# Patient Record
Sex: MALE | Race: WHITE | URBAN - METROPOLITAN AREA
[De-identification: names, ages, dates, MRNs, and addresses within clinical notes are randomized per-mention and may not be internally consistent; named-entity substitution may affect disease eponyms.]

---

## 2018-01-16 NOTE — PROGRESS NOTES
"  SUBJECTIVE:                                                    Ronnie Jackson is a 27 year old male who presents to clinic today for the following health issues:    HPI    Patient in clinic today for hemorrhoid.     Hemorrhoids  Onset: ongoing for several years, much worse over the past week    Description:   Pain: YES  Itching: YES    Accompanying Signs & Symptoms:  Blood streaked toilet paper: YES  Blood in stool: YES  Changes in stool pattern: YES - mucous and stringy    History:   Any previous GI studies done:unsure  Family History of colon cancer: No    Precipitating factors:   Change in medications    Alleviating factors:  Increased fiber in diet    Therapies Tried and outcome: increased fiber in diet    He states he has had hemorrhoids for years but the bleeding has been worse over the past week and he will have dripping blood during every bowel movement, usually 1-2 times per day and will often have to push the hemorrhoids back in place. He denies any blood within his stool. He does describe pain in the rectal area for the past month that radiates into the back. He has tried creams in the past without benefit. He also has more frequent burning in the epigastric area after drinking a beer or an energy drink which seems worse since starting Concerta 2 months ago. He denies any nausea, vomiting, diarrhea, constipation, fevers or chills. He does drink a fiber supplement daily which keeps his stools soft but he states he stools have been slightly \"mucousy\" lately. He was seen in the ED in 2016 for abdominal pain, diarrhea, and rectal bleeding with a normal abdominal CT and labs. It was recommended he undergo and EGD and colonoscopy but he never followed through with this. He denies any family history of colon cancer.     Problem list and histories reviewed & adjusted, as indicated.  Additional history: none    ROS:  GENERAL: Denies fever, fatigue, weakness, weight gain, or weight loss.  CARDIOVASCULAR: Denies " "chest pain, shortness of breath, irregular heartbeats,  palpitations, or edema.  RESPIRATORY: Denies cough, hemoptysis, and shortness of breath.  GASTROINTESTINAL: +Rectal bleeding/pain, epigastric pain. Denies nausea, vomiting, change in appetite, diarrhea, or constipation.  NEUROLOGIC: Denies headache, fainting, dizziness, memory loss, numbness, tingling, or seizures.      OBJECTIVE:     /68 (BP Location: Right arm, Patient Position: Chair, Cuff Size: Adult Large)  Pulse 95  Temp 98.9  F (37.2  C) (Temporal)  Resp 16  Ht 5' 10\" (1.778 m)  Wt 145 lb (65.8 kg)  SpO2 100%  BMI 20.81 kg/m2  Body mass index is 20.81 kg/(m^2).  GENERAL: healthy, alert and no distress  RESP: lungs clear to auscultation - no rales, rhonchi or wheezes  CV: regular rate and rhythm, normal S1 S2, no S3 or S4, no murmur, click or rub, no peripheral edema and peripheral pulses strong  ABDOMEN: soft, nontender, no hepatosplenomegaly, no masses and bowel sounds normal  RECTAL (male): normal sphincter tone, no visualized hemorrhoids on external exam    ASSESSMENT/PLAN:       ICD-10-CM    1. Rectal bleeding K62.5 CBC with platelets and differential     GENERAL SURG ADULT REFERRAL   2. Internal hemorrhoids K64.8 GENERAL SURG ADULT REFERRAL   3. Gastroesophageal reflux disease, esophagitis presence not specified K21.9 omeprazole (PRILOSEC) 20 MG CR capsule   4. Mucous in stools R19.5 Comprehensive metabolic panel (BMP + Alb, Alk Phos, ALT, AST, Total. Bili, TP)     Enteric Bacteria and Virus Panel by TALIA Stool     Fecal Lactoferrin     CBC with platelets and differential         Persistent rectal bleeding for the past month consistent with internal hemorrhoids although none were visualized on inspection today. I recommend he continue with a daily fiber supplement and re-try Preparation H cream as needed. I also recommend Sitz baths. Since symptoms have been worsening with new pain over the past month, I will send him to general surgery " for further evaluation and to discuss treatment options for his hemorrhoids. He is wondering if a colonoscopy is warranted and I told him to discuss this further with the general surgeon. We were able to get him seen today. If colonoscopy is warranted, he is medically cleared for this procedure.     For the mucous in his stools, will order a stool panel for further evaluation along with labs to rule out infection. He denies any change in color of his stools and does not drink alcohol often, maybe once per month.    His epigastric pain is consistent with GERD so will start him on omeprazole daily with breakfast to take for 6-8 weeks. If not helping, he will let me know. If working well, may switch to an H2 blocker instead due to potential long-term side effects of PPIs. He was encouraged to cut back on the foods that exacerbate his symptoms like spicy foods, tomato based foods, and energy drinks.       Dave Shipman PA-C  [facility]

## 2018-01-17 ENCOUNTER — OFFICE VISIT (OUTPATIENT)
Dept: SURGERY | Facility: OTHER | Age: 28
End: 2018-01-17
Payer: COMMERCIAL

## 2018-01-17 ENCOUNTER — TELEPHONE (OUTPATIENT)
Dept: SURGERY | Facility: OTHER | Age: 28
End: 2018-01-17

## 2018-01-17 ENCOUNTER — OFFICE VISIT (OUTPATIENT)
Dept: FAMILY MEDICINE | Facility: OTHER | Age: 28
End: 2018-01-17
Payer: COMMERCIAL

## 2018-01-17 VITALS
DIASTOLIC BLOOD PRESSURE: 68 MMHG | SYSTOLIC BLOOD PRESSURE: 136 MMHG | BODY MASS INDEX: 20.76 KG/M2 | HEIGHT: 70 IN | RESPIRATION RATE: 16 BRPM | TEMPERATURE: 98.9 F | OXYGEN SATURATION: 100 % | HEART RATE: 95 BPM | WEIGHT: 145 LBS

## 2018-01-17 VITALS
OXYGEN SATURATION: 100 % | HEART RATE: 95 BPM | BODY MASS INDEX: 20.81 KG/M2 | TEMPERATURE: 98.9 F | SYSTOLIC BLOOD PRESSURE: 136 MMHG | DIASTOLIC BLOOD PRESSURE: 68 MMHG | RESPIRATION RATE: 16 BRPM | WEIGHT: 145 LBS

## 2018-01-17 DIAGNOSIS — K62.89 ANAL OR RECTAL PAIN: ICD-10-CM

## 2018-01-17 DIAGNOSIS — K21.9 GASTROESOPHAGEAL REFLUX DISEASE, ESOPHAGITIS PRESENCE NOT SPECIFIED: ICD-10-CM

## 2018-01-17 DIAGNOSIS — K64.8 INTERNAL HEMORRHOIDS: Primary | ICD-10-CM

## 2018-01-17 DIAGNOSIS — F41.9 ANXIETY: ICD-10-CM

## 2018-01-17 DIAGNOSIS — R19.5 MUCOUS IN STOOLS: ICD-10-CM

## 2018-01-17 DIAGNOSIS — K62.5 BRBPR (BRIGHT RED BLOOD PER RECTUM): ICD-10-CM

## 2018-01-17 DIAGNOSIS — K64.8 INTERNAL HEMORRHOIDS: ICD-10-CM

## 2018-01-17 DIAGNOSIS — K62.5 RECTAL BLEEDING: Primary | ICD-10-CM

## 2018-01-17 LAB
ALBUMIN SERPL-MCNC: 4.6 G/DL (ref 3.4–5)
ALP SERPL-CCNC: 77 U/L (ref 40–150)
ALT SERPL W P-5'-P-CCNC: 23 U/L (ref 0–70)
ANION GAP SERPL CALCULATED.3IONS-SCNC: 7 MMOL/L (ref 3–14)
AST SERPL W P-5'-P-CCNC: 16 U/L (ref 0–45)
BASOPHILS # BLD AUTO: 0 10E9/L (ref 0–0.2)
BASOPHILS NFR BLD AUTO: 0.5 %
BILIRUB SERPL-MCNC: 0.8 MG/DL (ref 0.2–1.3)
BUN SERPL-MCNC: 10 MG/DL (ref 7–30)
CALCIUM SERPL-MCNC: 8.9 MG/DL (ref 8.5–10.1)
CHLORIDE SERPL-SCNC: 102 MMOL/L (ref 94–109)
CO2 SERPL-SCNC: 29 MMOL/L (ref 20–32)
CREAT SERPL-MCNC: 0.79 MG/DL (ref 0.66–1.25)
DIFFERENTIAL METHOD BLD: NORMAL
EOSINOPHIL # BLD AUTO: 0 10E9/L (ref 0–0.7)
EOSINOPHIL NFR BLD AUTO: 0.3 %
ERYTHROCYTE [DISTWIDTH] IN BLOOD BY AUTOMATED COUNT: 13 % (ref 10–15)
GFR SERPL CREATININE-BSD FRML MDRD: >90 ML/MIN/1.7M2
GLUCOSE SERPL-MCNC: 111 MG/DL (ref 70–99)
HCT VFR BLD AUTO: 43.7 % (ref 40–53)
HGB BLD-MCNC: 14.9 G/DL (ref 13.3–17.7)
LYMPHOCYTES # BLD AUTO: 1.7 10E9/L (ref 0.8–5.3)
LYMPHOCYTES NFR BLD AUTO: 27.9 %
MCH RBC QN AUTO: 29.1 PG (ref 26.5–33)
MCHC RBC AUTO-ENTMCNC: 34.1 G/DL (ref 31.5–36.5)
MCV RBC AUTO: 85 FL (ref 78–100)
MONOCYTES # BLD AUTO: 0.6 10E9/L (ref 0–1.3)
MONOCYTES NFR BLD AUTO: 10 %
NEUTROPHILS # BLD AUTO: 3.7 10E9/L (ref 1.6–8.3)
NEUTROPHILS NFR BLD AUTO: 61.3 %
PLATELET # BLD AUTO: 239 10E9/L (ref 150–450)
POTASSIUM SERPL-SCNC: 4.5 MMOL/L (ref 3.4–5.3)
PROT SERPL-MCNC: 7.2 G/DL (ref 6.8–8.8)
RBC # BLD AUTO: 5.12 10E12/L (ref 4.4–5.9)
SODIUM SERPL-SCNC: 138 MMOL/L (ref 133–144)
WBC # BLD AUTO: 6.1 10E9/L (ref 4–11)

## 2018-01-17 PROCEDURE — 85025 COMPLETE CBC W/AUTO DIFF WBC: CPT | Performed by: PHYSICIAN ASSISTANT

## 2018-01-17 PROCEDURE — 46600 DIAGNOSTIC ANOSCOPY SPX: CPT | Performed by: SURGERY

## 2018-01-17 PROCEDURE — 80053 COMPREHEN METABOLIC PANEL: CPT | Performed by: PHYSICIAN ASSISTANT

## 2018-01-17 PROCEDURE — 36415 COLL VENOUS BLD VENIPUNCTURE: CPT | Performed by: PHYSICIAN ASSISTANT

## 2018-01-17 PROCEDURE — 99214 OFFICE O/P EST MOD 30 MIN: CPT | Performed by: PHYSICIAN ASSISTANT

## 2018-01-17 PROCEDURE — 99244 OFF/OP CNSLTJ NEW/EST MOD 40: CPT | Mod: 25 | Performed by: SURGERY

## 2018-01-17 RX ORDER — METHYLPHENIDATE HYDROCHLORIDE 36 MG/1
TABLET ORAL
COMMUNITY

## 2018-01-17 RX ORDER — GUANFACINE 2 MG/1
2 TABLET ORAL AT BEDTIME
COMMUNITY

## 2018-01-17 RX ORDER — ESCITALOPRAM OXALATE 10 MG/1
50 TABLET ORAL DAILY
COMMUNITY

## 2018-01-17 RX ORDER — GABAPENTIN 300 MG/1
300 CAPSULE ORAL 3 TIMES DAILY
COMMUNITY

## 2018-01-17 ASSESSMENT — ANXIETY QUESTIONNAIRES
6. BECOMING EASILY ANNOYED OR IRRITABLE: NEARLY EVERY DAY
GAD7 TOTAL SCORE: 21
1. FEELING NERVOUS, ANXIOUS, OR ON EDGE: NEARLY EVERY DAY
GAD7 TOTAL SCORE: 21
3. WORRYING TOO MUCH ABOUT DIFFERENT THINGS: NEARLY EVERY DAY
7. FEELING AFRAID AS IF SOMETHING AWFUL MIGHT HAPPEN: NEARLY EVERY DAY
GAD7 TOTAL SCORE: 21
4. TROUBLE RELAXING: NEARLY EVERY DAY
5. BEING SO RESTLESS THAT IT IS HARD TO SIT STILL: NEARLY EVERY DAY
7. FEELING AFRAID AS IF SOMETHING AWFUL MIGHT HAPPEN: NEARLY EVERY DAY
2. NOT BEING ABLE TO STOP OR CONTROL WORRYING: NEARLY EVERY DAY

## 2018-01-17 ASSESSMENT — PAIN SCALES - GENERAL: PAINLEVEL: NO PAIN (0)

## 2018-01-17 NOTE — PATIENT INSTRUCTIONS
Your continued rectal bleeding is consistent with internal hemorrhoids.  Continue with a daily fiber supplement along with trying preparation H cream as needed to help with bleeding and discomfort.  Sitting in a warm water bath can also be helpful.  I will set you up with a surgeon for further evaluation and to discuss other treatment options for the hemorrhoids.  Will order labs today and you can bring in your stool testing at your convenience.     For the heartburn, I recommend omeprazole daily with breakfast for 4-6 weeks.  If symptoms are not improving, let me know.  Try to avoid spicy and tomato based foods along with alcohol.

## 2018-01-17 NOTE — MR AVS SNAPSHOT
After Visit Summary   1/17/2018    Ronnie Jackson    MRN: [id number]           Patient Information     Date Of Birth          1990        Visit Information        Provider Department      1/17/2018 8:30 AM Dave Shipman PA-C [facility]        Today's Diagnoses     Rectal bleeding    -  1    Internal hemorrhoids        Gastroesophageal reflux disease, esophagitis presence not specified        Mucous in stools          Care Instructions    Your continued rectal bleeding is consistent with internal hemorrhoids.  Continue with a daily fiber supplement along with trying preparation H cream as needed to help with bleeding and discomfort.  Sitting in a warm water bath can also be helpful.  I will set you up with a surgeon for further evaluation and to discuss other treatment options for the hemorrhoids.  Will order labs today and you can bring in your stool testing at your convenience.     For the heartburn, I recommend omeprazole daily with breakfast for 4-6 weeks.  If symptoms are not improving, let me know.  Try to avoid spicy and tomato based foods along with alcohol.                    Follow-ups after your visit        Additional Services     GENERAL SURG ADULT REFERRAL       Your provider has referred you to: [facility]: [facility] [phone]   [email or web address]     Please be aware that coverage of these services is subject to the terms and limitations of your health insurance plan.  Call member services at your health plan with any benefit or coverage questions.      Please bring the following with you to your appointment:    (1) Any X-Rays, CTs or MRIs which have been performed.  Contact the facility where they were done to arrange for  prior to your scheduled appointment.   (2) List of current medications   (3) This referral request   (4) Any documents/labs given to you for this referral                  Follow-up  "notes from your care team     Return if symptoms worsen or fail to improve.      Future tests that were ordered for you today     Open Future Orders        Priority Expected Expires Ordered    Enteric Bacteria and Virus Panel by TALIA Stool Routine  2019    Fecal Lactoferrin Routine  2019            Who to contact     If you have questions or need follow up information about today's clinic visit or your schedule please contact [facility] directly at [phone].  Normal or non-critical lab and imaging results will be communicated to you by MyChart, letter or phone within 4 business days after the clinic has received the results. If you do not hear from us within 7 days, please contact the clinic through [facility] or phone. If you have a critical or abnormal lab result, we will notify you by phone as soon as possible.  Submit refill requests through [employer] or call your pharmacy and they will forward the refill request to us. Please allow 3 business days for your refill to be completed.          Additional Information About Your Visit        [facility] Information     [employer] lets you send messages to your doctor, view your test results, renew your prescriptions, schedule appointments and more. To sign up, go to [email or web address] . Click on \"Log in\" on the left side of the screen, which will take you to the Welcome page. Then click on \"Sign up Now\" on the right side of the page.     You will be asked to enter the access code listed below, as well as some personal information. Please follow the directions to create your username and password.     Your access code is: [email or web address]  Expires: 2018  9:16 AM     Your access code will  in 90 days. If you need help or a new code, please call your [facility] or [phone].        Care EveryWhere ID     This is your Care EveryWhere ID. This could be used by other organizations to access your [facility] medical " "records  [id number]        Your Vitals Were     Pulse Temperature Respirations Height Pulse Oximetry BMI (Body Mass Index)    95 98.9  F (37.2  C) (Temporal) 16 5' 10\" (1.778 m) 100% 20.81 kg/m2       Blood Pressure from Last 3 Encounters:   01/17/18 136/68   03/09/16 120/80   03/05/16 142/85    Weight from Last 3 Encounters:   01/17/18 145 lb (65.8 kg)   03/09/16 149 lb 8 oz (67.8 kg)   03/05/16 150 lb (68 kg)              We Performed the Following     CBC with platelets and differential     Comprehensive metabolic panel (BMP + Alb, Alk Phos, ALT, AST, Total. Bili, TP)     GENERAL SURG ADULT REFERRAL          Today's Medication Changes          These changes are accurate as of: 1/17/18  9:18 AM.  If you have any questions, ask your nurse or doctor.               Start taking these medicines.        Dose/Directions    omeprazole 20 MG CR capsule   Commonly known as:  priLOSEC   Used for:  Gastroesophageal reflux disease, esophagitis presence not specified   Started by:  Dave Shipman PA-C        Dose:  20 mg   Take 1 capsule (20 mg) by mouth daily   Quantity:  30 capsule   Refills:  1         Stop taking these medicines if you haven't already. Please contact your care team if you have questions.     AMBIEN PO   Stopped by:  Dave Shipman PA-C           CARAFATE 1 GM/10ML suspension   Generic drug:  sucralfate   Stopped by:  Dave Shipman PA-C           SERTRALINE HCL PO   Stopped by:  Dave Shipman PA-C           VISTARIL PO   Stopped by:  Dave Shipman PA-C                Where to get your medicines      These medications were sent to [id number] - [place], [place] - [address] AT [address], [place] [address]    Hours:  Test fax successful 9/6/02   Phone:  [phone]     omeprazole 20 MG CR capsule                Primary Care Provider Fax #    Physician No Ref-Primary [phone]       No address on " file        Equal Access to Services     [facility] : Hadii gloria chamorro winston Watts, wasummerda luqadaha, qatammieta svetlanaseanlauren redding, [employer]. So [facility] [phone].    ATENCIÓN: Si habla español, tiene a allen disposición servicios gratuitos de asistencia lingüística. Llame al [phone].    We comply with applicable federal civil rights laws and [place] laws. We do not discriminate on the basis of race, color, national origin, age, disability, sex, sexual orientation, or gender identity.            Thank you!     Thank you for choosing [facility]  for your care. Our goal is always to provide you with excellent care. Hearing back from our patients is one way we can continue to improve our services. Please take a few minutes to complete the written survey that you may receive in the mail after your visit with us. Thank you!             Your Updated Medication List - Protect others around you: Learn how to safely use, store and throw away your medicines at www.disposemymeds.org.          This list is accurate as of: 1/17/18  9:18 AM.  Always use your most recent med list.                   Brand Name Dispense Instructions for use Diagnosis    CLONAZEPAM PO      Take 0.5 mg by mouth 3 times daily as needed for anxiety        CONCERTA 36 MG CR tablet   Generic drug:  methylphenidate ER           gabapentin 300 MG capsule    NEURONTIN     Take 300 mg by mouth 3 times daily        guanFACINE 2 MG tablet    TENEX     Take 2 mg by mouth At Bedtime        LEXAPRO 10 MG tablet   Generic drug:  escitalopram      Take 50 mg by mouth daily        omeprazole 20 MG CR capsule    priLOSEC    30 capsule    Take 1 capsule (20 mg) by mouth daily    Gastroesophageal reflux disease, esophagitis presence not specified       TEMAZEPAM PO

## 2018-01-17 NOTE — TELEPHONE ENCOUNTER
Surgery Scheduled    Date of Surgery 1/29/2018   Procedure: Diagnostic, colonoscopy, exam under anesthesia, possible of internal hemorrhoids  Hospital/Surgical Facility: [facility]  Surgeon: Dr. Benitez  Type of Anesthesia : MAC  Pre-op NA with   2 week post op:3 weeks after, patient will schedule        Surgery packet given to patient in clinic. Patients instructed to arrive 1 hours prior to surgery. Patient understood and agrees to plan.    Miranda Briceno  Surgical Scheduler

## 2018-01-17 NOTE — PATIENT INSTRUCTIONS
Anal Fissure  An anal fissure is a small tear in the lining of the anal canal. This type of tear may develop in adults from passing hard or large stools during bowel movements.   An anal fissure may cause you to experience pain and bleeding. More than 90 percent heal without surgery, and you can use topical creams or suppositories to provide relief as they heal. Anal fissures that fail to heal may become chronic and cause considerable discomfort.   If you develop an anal fissure that doesn't heal with medication, surgery may relieve your discomfort.   Anal fissures are one of the most common causes of anal pain. They occur at any age and often in people who are frequently or severely constipated. The majority of anal fissures heal with self-care steps, but some may require medication or surgery to relax the anal sphincter muscle. We always try conservative therapies such as medications before discussing surgical option.  Should surgery be necessary, patients seeking treatment at [facility] benefit from the experience of highly skilled surgeons and associate staff who can use less invasive techniques with smaller incisions and faster healing.  It is VERY important that you have a colonoscopy at age 50 or earlier if there is a familial history of colon/Rectal cancer. This test can detect colon or rectal cancer along with many other diseases that may not give you any symptoms. You may not even know that you have colon disease or cancer. Colon cancer is very treatable if detected early. Many lives are saved each year by having this examination.    When to see a doctor   See your doctor if you have pain during bowel movements or blood on stools or toilet paper after a bowel movement.  Causes of anal fissure include:     Large or hard stool passing through the anal canal    Constipation and straining during bowel movements    Inflammation of the anorectal area, such as is caused by  inflammatory bowel disease (IBD)     Anal sex    Trauma to the rectum    The main signs and symptoms of an anal fissure include:     Pain or burning during bowel movements that eases until the next bowel movement    Bright red blood on the outside of the stool or on toilet paper or wipes after a bowel movement    Itching or irritation around the anus.    A visible crack in the skin around the anus.  Treatment of acute anal fissures aims to break the cycle of passing hard stools (or explosive diarrhea) and the resulting pain and spasms. Treatment with medications that are applied to the rectum is aimed at stopping or decreasing the rectal muscle and valve (sphincter) from spasm. When the rectal muscles spasm or tense up from pain, irritation, straining they decrease blood flow to the injured area (fissure) and this delays healing. By relaxing the muscle with creams and sitz baths there is greater blood supply and healing of the fissure.   Healing time- fissures heal over a long period of time. This can take weeks to months and patience is needed as well as daily treatment. There is  no quick fix  to this medical problem.    Doctors generally recommend self-care procedures such as:    Soaking in warm water for 10 minutes to relax the anal sphincter muscle (Rectum)    Consumption of additional liquids, especially water and fiber    Stool bulking agents such as a bulk forming fiber supplement, with psyllium (generic brands are just fine to use).    Try to get 20-35 grams of fiber daily.    Acetaminophen (Tylenol) for pain.     Nonsteroidal anti-inflammatory drugs (NSAIDS) such as Ibuprofen, Motrin, Aleve can help to decrease inflammation and swelling in the area affected.    Use  Baby wipes  that do not have perfume, deodorants or other chemicals is less abrasive than toilet paper, and will clean the area better after bowel movements.       Fiber Supplementation- Very important! You should use a fiber supplement to ease  "the amount of straining when having a bowel movement. The fiber will lubricate the stool and will form more bulk allowing for easier passage. It will also decre *ase the amount of time spent sitting on the toilet which can prolong healing of the fissure.     Instructions- Take 1 to 1.5 table spoons of a  bulk forming laxative  Powder in the morning  with 8 ounces of water or juice. It is ok to buy generic brands, they are cheaper. The product should contain Psyllium. You should drink plenty of water during the day (64 ounces is preferred) unless you are on fluid restrictions from your health care provider. The fiber powder comes in different flavors, orange etc. Other fiber supplements such as wafers, fiber bars are also acceptable.     [employer] has their generic Equate Brand, called \"Fiber Therapy\" it's cheap, has psyllium and one large container will last about 3 months.     Medications    1. Diltiazem 2% cream (Cardizem)- is a blood pressure medication used to decrease the muscle spasms around the anus (rectum). This helps to promote blood flood to the fissure and thus healing. This should be applied to the rectum once in the morning and once in the evening.    2.  Lidocaine 2% gel-This medication is numbing medicine and can be applied to the end of the suppository prior to inserting it into the rectum. It can be used intermittently during the day and should be applied to the anus using your finger to help with  pain and flare up.  It should not be used continuously during the day.    3. Annusol HC 25mg Suppositories- These suppositories are inserted into the rectum using your finger. The suppository will decrease swelling and inflammation around the rectum. It contains a small amount of steroid and this will help with healing of the fissure. You will typically only use the Anusol HC suppositories for 2 weeks.    4. Sitz baths- sitting in the bathtub or using a shower wand with warm water for 10 to 15 minutes, 2 " to 3 times per day will help keep the rectum clean and will help the healing process by promoting blood flow.      5. Baby wipes-using baby wipes that are flushable, deodorant free and detergent free is less abrasive then using toilet paper when wiping after having a bowel movement.

## 2018-01-17 NOTE — LETTER
1/17/2018         RE: Ronnie Jackson  [address] [facility] [address]        Dear Colleague,    Thank you for referring your patient, Ronnie Jackson, to the [facility]. Please see a copy of my visit note below.    General Surgery Consultation    Ronnie Jackson MRN# [id number]   Age: 27 year old YOB: 1990     Reason for consult: Hemorrhoids, internal  Hemorrhoids, external                        Assessment and Plan:   I was asked to see this patient at the request of Rivera Shipmanfor evaluation of .  Ronnie Jackson is a 27 year old male who presented with history, exam, laboratory and imaging most consistent with:       ICD-10-CM    1. Internal hemorrhoids K64.8 DIAGNOSTIC ANOSCOPY [29692]   2. BRBPR (bright red blood per rectum) K62.5 DIAGNOSTIC ANOSCOPY [31206]   3. Anal or rectal pain K62.89 DIAGNOSTIC ANOSCOPY [33905]   4. Anxiety F41.9      Will schedule colonoscopy, examine under anesthesia, possible banding of bleeding internal hemorrhoids.  Told pt that I will not band the internal hemorrhoids unless it is bleeding  Pt symptoms of anal/rectal pain at the 6-7 oclock sounds like a fissure - however, I could not see any small mucosa tear at the anal opening; and I could not see clearly on the anoscopy exam in the office.  It could be very small and higher up.    We talk about conservative management for internal hemorrhoids and fissure:    -ingest 20 to 30 g of insoluble fiber per day and drink plenty of water (1.5 to 2 liters per day). Both are necessary to produce regular, soft stools, which reduce straining at defecation. It could take six weeks to fully realize the beneficial effect of fiber   -refrain from straining or lingering (eg, reading) on the toilet.  - have regular physical exercise  - limit their intake of fatty foods and alcohol, which can exacerbate constipation   - Sitz baths can relieve irritation and pruritus as well as spasm of the anal sphincter muscles. Used  with warm, rather than cold, water two to three times per day  Will try the above now and after colonoscopy  I will add Cardizem ointment if I see an actual fissure in the exam under anesthesia   See me back in the office in 3-4 weeks to see if symptoms improve    45 mins visit, more than 50% of face to face time was spent in counseling and coordinating care as discussed above.      I thank WALTER Garber for the opportunity to participate in the patient's care.           Chief Complaint:   Internal hemorrhoids, BRBPR, anal pain     History is obtained from the patient         History of Present Illness:   This patient is a 27 year old  male with a significant past medical history of anxiety who presents with 1 week history of BRBPR.  Patient stated that he has been struggling with hemorrhoids for several years.  He has had intermittent flareup of internal hemorrhoids.  He has had bright red blood per rectum in the past however the bleeding would go away after 1-2 days.  This particular episode, the bleeding conts to persist.  He noted dripping blood into the toilet after his morning BM the past week.  Hes been on OTC fiber for several years, he takes fiber every morning and it does help soften his stool.   Pt also complains of rectal/anal pain in posterior midline and radiates into his back.    Pt endorses some mucosy stools the past few BMs.  Pt stated that he just started Concerta for his anxiety and ADHD, thinks that hes become more constipated and has been straining more when he has his BMs.  Pt denies any recent trauma, changes in sexual practices, and no one particular episode of very hard stool that would incite the bleeding/pain.  Pt denies history of having fissures.  Denies hx of crohn's or UC.  Denies famhx of IBD also.   He ever had a colonoscopy; denies famhx of colon cancer; denies personal history of colon cancer or any cancers.  Pt has not increased the amt of fiber he's taking; stated he  "eats more \"fibery\" foods and dialy fiber bars.  Pt tried prepH for the hemorrhoids with no relieve.  Pt states he can push his hemorrhoids back in.    Denies any fevers, no chills, no purulent discharge from the rectum; not tender on palpation.            Past Medical History:    has a past medical history of Anxiety; Rectal bleeding; and Recurring abdominal pain.          Past Surgical History:   No past surgical history on file.        Medications:     Current Outpatient Prescriptions on File Prior to Visit:  methylphenidate ER (CONCERTA) 36 MG CR tablet    gabapentin (NEURONTIN) 300 MG capsule Take 300 mg by mouth 3 times daily   TEMAZEPAM PO    escitalopram (LEXAPRO) 10 MG tablet Take 50 mg by mouth daily   guanFACINE (TENEX) 2 MG tablet Take 2 mg by mouth At Bedtime   omeprazole (PRILOSEC) 20 MG CR capsule Take 1 capsule (20 mg) by mouth daily   CLONAZEPAM PO Take 0.5 mg by mouth 3 times daily as needed for anxiety     No current facility-administered medications on file prior to visit.       Allergies:    No Known Allergies         Social History:   Ronnie Jackson  reports that he has been smoking.  He has been smoking about 0.50 packs per day. He has never used smokeless tobacco. He reports that he does not drink alcohol or use illicit drugs.          Family History:   The patient has no family history of any bleeding, clotting or anesthesia problems.          Review of Systems:     Skin: negative, scaling, itching, bruising  Eyes: negative, visual blurring, double vision  Ears/Nose/Throat: negative, postnasal drainage, hearing loss  Respiratory: No shortness of breath, dyspnea on exertion, cough, or hemoptysis  Cardiovascular: negative, palpitations, tachycardia and irregular heart beat  Gastrointestinal: positive internal hemorrhoids, anal pain, bright red blood per rectum  Genitourinary: negative, dysuria, frequency and urgency  Musculoskeletal: negative, neck pain, arthritis and joint pain  Neurologic: " negative  Psychiatric: positive anxiety  Hematologic/Lymphatic/Immunologic: negative  Endocrine: negative         Physical Exam:     Constitutional: Awake, alert, no acute distress.  Eyes:  No scleral icterus.  Conjunctiva are without injection.  ENMT: Mucous membranes moist, dentition and gums are intact.   Neck: Soft, supple, trachea midline.    Endocrine: The thyroid is without masses and mobile with swallow.   Lymphatic: There is no cervical, submandibular, supraclavicular/infraclavicular, axillary, or inguinal adenopathy.  Respiratory: Lungs are clear to auscultation and percussion bilaterally.   Cardiovascular: Regular rate and rhythm. No murmurs, rubs, or gallops.    Abdomen: Non-distended, non-tender, normoactive bowel sounds present, No masses, umbilical or inguinal hernias, or flank tenderness. No hepatosplenomegaly.     Anoscope exam: External examination does not reveal external hemorrhoids, skin tags, or fistulas. The patient has pain the Posterior midline; .no fissure is noted in the posterior midline  Digital rectal examination reveals normal rectal tone. No signs of abscess or abnormal masses; pt does have pain at the posterior midline but no obvious fissure seen.  There are no signs suggestive of malignancy on examination.  There are internal hemorrhoids, noted some blood of anoscopy but no active bleeding noted form any of the hemorrhoids pillar - internal hemorrhoids grade 1-2.   Pt has not had a colonoscopy - will get schedule soon.      Musculoskeletal: No spinal or CVA tenderness. Full range of motion in the upper and lower extremities.    Skin: No skin rashes or lesions to inspection.  No petechia.    Neurologic: Cranial nerves II through XII are grossly intact and symmetric.  Psychiatric: The patient is alert and oriented times 3.  The patient's affect is not blunted and mood is appropriate.          Data:   WBC -   WBC   Date Value Ref Range Status   01/17/2018 6.1 4.0 - 11.0 10e9/L Final    ], HgB -   Hemoglobin   Date Value Ref Range Status   01/17/2018 14.9 13.3 - 17.7 g/dL Final   ]   Liver Function Studies -   Recent Labs   Lab Test  03/05/16   1004   PROTTOTAL  7.6   ALBUMIN  4.7   BILITOTAL  1.2   ALKPHOS  97   AST  9   ALT  22     No results found for this or any previous visit (from the past 744 hour(s)).     Tanya Benitez DO 1/17/2018 10:20 AM         Please schedule for surgery, pre op H&P, and post ops.    Surgery:  Patient Name:  Ronnie Jackson ([id number])  Procedure:   Diagnostic colonoscopy, exam under anesthesia, possible of internal hemorrhoids  Diagnosis:    Bright red blood per rectum, internal hemorrhoids, anal/rectal pain   Assistant: Single scrub tech  Surgeon:  Tanya Benitez MD  Anesthesia:  MAC  PT type:  Same Day Surgery  Time needed: 60 minutes  Patient position:  left lateral recumbant  Mini fluoro:  No  C-arm:  no  Equipment:  n/a  Anticoagulation:  No  Vendor:  no  Surgeon Notes:     Post op appts:    3 weeks from this week    Expected work restrictions:  No restrictions    [facility] Discussed:  Not Applicable      Again, thank you for allowing me to participate in the care of your patient.        Sincerely,        Tanya Benitez MD

## 2018-01-17 NOTE — NURSING NOTE
"Chief Complaint   Patient presents with     Rectal Problem     Panel Management     tay, kaushikt, height, tdap, flu       Initial /68 (BP Location: Right arm, Patient Position: Chair, Cuff Size: Adult Large)  Pulse 95  Temp 98.9  F (37.2  C) (Temporal)  Resp 16  Ht 5' 10\" (1.778 m)  Wt 145 lb (65.8 kg)  SpO2 100%  BMI 20.81 kg/m2 Estimated body mass index is 20.81 kg/(m^2) as calculated from the following:    Height as of this encounter: 5' 10\" (1.778 m).    Weight as of this encounter: 145 lb (65.8 kg).  Medication Reconciliation: complete   Salud Chavez CMA      "

## 2018-01-17 NOTE — MR AVS SNAPSHOT
After Visit Summary   1/17/2018    Ronnie Jackson    MRN: [id number]           Patient Information     Date Of Birth          1990        Visit Information        Provider Department      1/17/2018 9:30 AM Tanya Benitez MD [facility]        Today's Diagnoses     Internal hemorrhoids    -  1    BRBPR (bright red blood per rectum)        Anal or rectal pain          Care Instructions                 Anal Fissure  An anal fissure is a small tear in the lining of the anal canal. This type of tear may develop in adults from passing hard or large stools during bowel movements.   An anal fissure may cause you to experience pain and bleeding. More than 90 percent heal without surgery, and you can use topical creams or suppositories to provide relief as they heal. Anal fissures that fail to heal may become chronic and cause considerable discomfort.   If you develop an anal fissure that doesn't heal with medication, surgery may relieve your discomfort.   Anal fissures are one of the most common causes of anal pain. They occur at any age and often in people who are frequently or severely constipated. The majority of anal fissures heal with self-care steps, but some may require medication or surgery to relax the anal sphincter muscle. We always try conservative therapies such as medications before discussing surgical option.  Should surgery be necessary, patients seeking treatment at [facility] benefit from the experience of highly skilled surgeons and associate staff who can use less invasive techniques with smaller incisions and faster healing.  It is VERY important that you have a colonoscopy at age 50 or earlier if there is a familial history of colon/Rectal cancer. This test can detect colon or rectal cancer along with many other diseases that may not give you any symptoms. You may not even know that you have colon disease or cancer. Colon cancer is very treatable  if detected early. Many lives are saved each year by having this examination.    When to see a doctor   See your doctor if you have pain during bowel movements or blood on stools or toilet paper after a bowel movement.  Causes of anal fissure include:     Large or hard stool passing through the anal canal    Constipation and straining during bowel movements    Inflammation of the anorectal area, such as is caused by inflammatory bowel disease (IBD)     Anal sex    Trauma to the rectum    The main signs and symptoms of an anal fissure include:     Pain or burning during bowel movements that eases until the next bowel movement    Bright red blood on the outside of the stool or on toilet paper or wipes after a bowel movement    Itching or irritation around the anus.    A visible crack in the skin around the anus.  Treatment of acute anal fissures aims to break the cycle of passing hard stools (or explosive diarrhea) and the resulting pain and spasms. Treatment with medications that are applied to the rectum is aimed at stopping or decreasing the rectal muscle and valve (sphincter) from spasm. When the rectal muscles spasm or tense up from pain, irritation, straining they decrease blood flow to the injured area (fissure) and this delays healing. By relaxing the muscle with creams and sitz baths there is greater blood supply and healing of the fissure.   Healing time- fissures heal over a long period of time. This can take weeks to months and patience is needed as well as daily treatment. There is  no quick fix  to this medical problem.    Doctors generally recommend self-care procedures such as:    Soaking in warm water for 10 minutes to relax the anal sphincter muscle (Rectum)    Consumption of additional liquids, especially water and fiber    Stool bulking agents such as a bulk forming fiber supplement, with psyllium (generic brands are just fine to use).    Try to get 20-35 grams of fiber daily.    Acetaminophen  "(Tylenol) for pain.     Nonsteroidal anti-inflammatory drugs (NSAIDS) such as Ibuprofen, Motrin, Aleve can help to decrease inflammation and swelling in the area affected.    Use  Baby wipes  that do not have perfume, deodorants or other chemicals is less abrasive than toilet paper, and will clean the area better after bowel movements.       Fiber Supplementation- Very important! You should use a fiber supplement to ease the amount of straining when having a bowel movement. The fiber will lubricate the stool and will form more bulk allowing for easier passage. It will also decre *ase the amount of time spent sitting on the toilet which can prolong healing of the fissure.     Instructions- Take 1 to 1.5 table spoons of a  bulk forming laxative  Powder in the morning  with 8 ounces of water or juice. It is ok to buy generic brands, they are cheaper. The product should contain Psyllium. You should drink plenty of water during the day (64 ounces is preferred) unless you are on fluid restrictions from your health care provider. The fiber powder comes in different flavors, orange etc. Other fiber supplements such as wafers, fiber bars are also acceptable.     [employer] has their generic Equate Brand, called \"Fiber Therapy\" it's cheap, has psyllium and one large container will last about 3 months.     Medications    1. Diltiazem 2% cream (Cardizem)- is a blood pressure medication used to decrease the muscle spasms around the anus (rectum). This helps to promote blood flood to the fissure and thus healing. This should be applied to the rectum once in the morning and once in the evening.    2.  Lidocaine 2% gel-This medication is numbing medicine and can be applied to the end of the suppository prior to inserting it into the rectum. It can be used intermittently during the day and should be applied to the anus using your finger to help with  pain and flare up.  It should not be used continuously during the day.    3. Annusol " HC 25mg Suppositories- These suppositories are inserted into the rectum using your finger. The suppository will decrease swelling and inflammation around the rectum. It contains a small amount of steroid and this will help with healing of the fissure. You will typically only use the Anusol HC suppositories for 2 weeks.    4. Sitz baths- sitting in the bathtub or using a shower wand with warm water for 10 to 15 minutes, 2 to 3 times per day will help keep the rectum clean and will help the healing process by promoting blood flow.      5. Baby wipes-using baby wipes that are flushable, deodorant free and detergent free is less abrasive then using toilet paper when wiping after having a bowel movement.                    Follow-ups after your visit        Future tests that were ordered for you today     Open Future Orders        Priority Expected Expires Ordered    Enteric Bacteria and Virus Panel by [facility] Stool Routine  1/17/2019 1/17/2018    Fecal Lactoferrin Routine  1/17/2019 1/17/2018            Who to contact     If you have questions or need follow up information about today's clinic visit or your schedule please contact [facility] directly at [phone].  Normal or non-critical lab and imaging results will be communicated to you by MyChart, letter or phone within 4 business days after the clinic has received the results. If you do not hear from us within 7 days, please contact the clinic through MyChart or phone. If you have a critical or abnormal lab result, we will notify you by phone as soon as possible.  Submit refill requests through [employer] or call your pharmacy and they will forward the refill request to us. Please allow 3 business days for your refill to be completed.          Additional Information About Your Visit        [employer] Information     [employer] lets you send messages to your doctor, view your test results, renew your prescriptions, schedule appointments and more. To sign up, go  "to [email or web address] . Click on \"Log in\" on the left side of the screen, which will take you to the Welcome page. Then click on \"Sign up Now\" on the right side of the page.     You will be asked to enter the access code listed below, as well as some personal information. Please follow the directions to create your username and password.     Your access code is: [email or web address]  Expires: 2018  9:16 AM     Your access code will  in 90 days. If you need help or a new code, please call your [facility] clinic or [phone].        Care EveryWhere ID     This is your Care EveryWhere ID. This could be used by other organizations to access your [facility] medical records  [id number]        Your Vitals Were     Pulse Temperature Respirations Pulse Oximetry BMI (Body Mass Index)       95 98.9  F (37.2  C) (Temporal) 16 100% 20.81 kg/m2        Blood Pressure from Last 3 Encounters:   18 136/68   18 136/68   16 120/80    Weight from Last 3 Encounters:   18 65.8 kg (145 lb)   18 65.8 kg (145 lb)   16 67.8 kg (149 lb 8 oz)              Today, you had the following     No orders found for display         Today's Medication Changes          These changes are accurate as of: 18 10:20 AM.  If you have any questions, ask your nurse or doctor.               Start taking these medicines.        Dose/Directions    omeprazole 20 MG CR capsule   Commonly known as:  priLOSEC   Used for:  Gastroesophageal reflux disease, esophagitis presence not specified   Started by:  Dave Shipman PA-C        Dose:  20 mg   Take 1 capsule (20 mg) by mouth daily   Quantity:  30 capsule   Refills:  1         Stop taking these medicines if you haven't already. Please contact your care team if you have questions.     AMBIEN PO   Stopped by:  Dave Shipman PA-C           CARAFATE 1 GM/10ML suspension   Generic drug:  sucralfate   Stopped by:  Dave Shipman PA-C           SERTRALINE " HCL PO   Stopped by:  Dave Shipman PA-C           VISTARIL PO   Stopped by:  Dave Shipman PA-C                Where to get your medicines      [id number] - [place], [place] - [address] AT [address] [place] [address] & [address], [place] [address]    Hours:  Test fax successful 9/6/02  KR Phone:  [phone]     omeprazole 20 MG CR capsule                Primary Care Provider Fax #    Physician No Ref-Primary [phone]       No address on file        Equal Access to Services     [facility]: Hadii gloria chamorro hadericko Soelyse, waaxda luqadaha, qaybta kaalmada miladis, ernst alvarez . So [facility] [phone].    ATENCIÓN: Si habla español, tiene a allen disposición servicios gratuitos de asistencia lingüística. [facility] [phone].    We comply with applicable federal civil rights laws and [place] laws. We do not discriminate on the basis of race, color, national origin, age, disability, sex, sexual orientation, or gender identity.            Thank you!     Thank you for choosing [facility]  for your care. Our goal is always to provide you with excellent care. Hearing back from our patients is one way we can continue to improve our services. Please take a few minutes to complete the written survey that you may receive in the mail after your visit with us. Thank you!             Your Updated Medication List - Protect others around you: Learn how to safely use, store and throw away your medicines at www.disposemymeds.org.          This list is accurate as of: 1/17/18 10:20 AM.  Always use your most recent med list.                   Brand Name Dispense Instructions for use Diagnosis    CLONAZEPAM PO      Take 0.5 mg by mouth 3 times daily as needed for anxiety        CONCERTA 36 MG CR tablet   Generic drug:  methylphenidate ER           gabapentin 300 MG capsule    NEURONTIN     Take 300  mg by mouth 3 times daily        guanFACINE 2 MG tablet    TENEX     Take 2 mg by mouth At Bedtime        LEXAPRO 10 MG tablet   Generic drug:  escitalopram      Take 50 mg by mouth daily        omeprazole 20 MG CR capsule    priLOSEC    30 capsule    Take 1 capsule (20 mg) by mouth daily    Gastroesophageal reflux disease, esophagitis presence not specified       TEMAZEPAM PO

## 2018-01-17 NOTE — NURSING NOTE
"Chief Complaint   Patient presents with     Consult     Referred by Rivera Shipman PA-C     Rectal Problem     Hemorrhoids x several years, but getting worse       Initial There were no vitals taken for this visit. Estimated body mass index is 20.81 kg/(m^2) as calculated from the following:    Height as of an earlier encounter on 1/17/18: 1.778 m (5' 10\").    Weight as of an earlier encounter on 1/17/18: 65.8 kg (145 lb).  Medication Reconciliation: complete    "

## 2018-01-17 NOTE — PROGRESS NOTES
General Surgery Consultation    Ronnie Jackson MRN# [id number]   Age: 27 year old YOB: 1990     Reason for consult: Hemorrhoids, internal  Hemorrhoids, external                        Assessment and Plan:   I was asked to see this patient at the request of Rivera Shipmanfor evaluation of .  Ronnie Jackson is a 27 year old male who presented with history, exam, laboratory and imaging most consistent with:       ICD-10-CM    1. Internal hemorrhoids K64.8 DIAGNOSTIC ANOSCOPY [78360]   2. BRBPR (bright red blood per rectum) K62.5 DIAGNOSTIC ANOSCOPY [59624]   3. Anal or rectal pain K62.89 DIAGNOSTIC ANOSCOPY [90682]   4. Anxiety F41.9      Will schedule colonoscopy, examine under anesthesia, possible banding of bleeding internal hemorrhoids.  Told pt that I will not band the internal hemorrhoids unless it is bleeding  Pt symptoms of anal/rectal pain at the 6-7 oclock sounds like a fissure - however, I could not see any small mucosa tear at the anal opening; and I could not see clearly on the anoscopy exam in the office.  It could be very small and higher up.    We talk about conservative management for internal hemorrhoids and fissure:    -ingest 20 to 30 g of insoluble fiber per day and drink plenty of water (1.5 to 2 liters per day). Both are necessary to produce regular, soft stools, which reduce straining at defecation. It could take six weeks to fully realize the beneficial effect of fiber   -refrain from straining or lingering (eg, reading) on the toilet.  - have regular physical exercise  - limit their intake of fatty foods and alcohol, which can exacerbate constipation   - Sitz baths can relieve irritation and pruritus as well as spasm of the anal sphincter muscles. Used with warm, rather than cold, water two to three times per day  Will try the above now and after colonoscopy  I will add Cardizem ointment if I see an actual fissure in the exam under anesthesia   See me back in the office in 3-4 weeks  "to see if symptoms improve    45 mins visit, more than 50% of face to face time was spent in counseling and coordinating care as discussed above.      I thank WALTER Garber for the opportunity to participate in the patient's care.           Chief Complaint:   Internal hemorrhoids, BRBPR, anal pain     History is obtained from the patient         History of Present Illness:   This patient is a 27 year old  male with a significant past medical history of anxiety who presents with 1 week history of BRBPR.  Patient stated that he has been struggling with hemorrhoids for several years.  He has had intermittent flareup of internal hemorrhoids.  He has had bright red blood per rectum in the past however the bleeding would go away after 1-2 days.  This particular episode, the bleeding conts to persist.  He noted dripping blood into the toilet after his morning BM the past week.  Hes been on OTC fiber for several years, he takes fiber every morning and it does help soften his stool.   Pt also complains of rectal/anal pain in posterior midline and radiates into his back.    Pt endorses some mucosy stools the past few BMs.  Pt stated that he just started Concerta for his anxiety and ADHD, thinks that hes become more constipated and has been straining more when he has his BMs.  Pt denies any recent trauma, changes in sexual practices, and no one particular episode of very hard stool that would incite the bleeding/pain.  Pt denies history of having fissures.  Denies hx of crohn's or UC.  Denies famhx of IBD also.   He ever had a colonoscopy; denies famhx of colon cancer; denies personal history of colon cancer or any cancers.  Pt has not increased the amt of fiber he's taking; stated he eats more \"fibery\" foods and dialy fiber bars.  Pt tried prepH for the hemorrhoids with no relieve.  Pt states he can push his hemorrhoids back in.    Denies any fevers, no chills, no purulent discharge from the rectum; not tender on " palpation.            Past Medical History:    has a past medical history of Anxiety; Rectal bleeding; and Recurring abdominal pain.          Past Surgical History:   No past surgical history on file.        Medications:     Current Outpatient Prescriptions on File Prior to Visit:  methylphenidate ER (CONCERTA) 36 MG CR tablet    gabapentin (NEURONTIN) 300 MG capsule Take 300 mg by mouth 3 times daily   TEMAZEPAM PO    escitalopram (LEXAPRO) 10 MG tablet Take 50 mg by mouth daily   guanFACINE (TENEX) 2 MG tablet Take 2 mg by mouth At Bedtime   omeprazole (PRILOSEC) 20 MG CR capsule Take 1 capsule (20 mg) by mouth daily   CLONAZEPAM PO Take 0.5 mg by mouth 3 times daily as needed for anxiety     No current facility-administered medications on file prior to visit.       Allergies:    No Known Allergies         Social History:   Ronnie Jackson  reports that he has been smoking.  He has been smoking about 0.50 packs per day. He has never used smokeless tobacco. He reports that he does not drink alcohol or use illicit drugs.          Family History:   The patient has no family history of any bleeding, clotting or anesthesia problems.          Review of Systems:     Skin: negative, scaling, itching, bruising  Eyes: negative, visual blurring, double vision  Ears/Nose/Throat: negative, postnasal drainage, hearing loss  Respiratory: No shortness of breath, dyspnea on exertion, cough, or hemoptysis  Cardiovascular: negative, palpitations, tachycardia and irregular heart beat  Gastrointestinal: positive internal hemorrhoids, anal pain, bright red blood per rectum  Genitourinary: negative, dysuria, frequency and urgency  Musculoskeletal: negative, neck pain, arthritis and joint pain  Neurologic: negative  Psychiatric: positive anxiety  Hematologic/Lymphatic/Immunologic: negative  Endocrine: negative         Physical Exam:     Constitutional: Awake, alert, no acute distress.  Eyes:  No scleral icterus.  Conjunctiva are without  injection.  ENMT: Mucous membranes moist, dentition and gums are intact.   Neck: Soft, supple, trachea midline.    Endocrine: The thyroid is without masses and mobile with swallow.   Lymphatic: There is no cervical, submandibular, supraclavicular/infraclavicular, axillary, or inguinal adenopathy.  Respiratory: Lungs are clear to auscultation and percussion bilaterally.   Cardiovascular: Regular rate and rhythm. No murmurs, rubs, or gallops.    Abdomen: Non-distended, non-tender, normoactive bowel sounds present, No masses, umbilical or inguinal hernias, or flank tenderness. No hepatosplenomegaly.     Anoscope exam: External examination does not reveal external hemorrhoids, skin tags, or fistulas. The patient has pain the Posterior midline; .no fissure is noted in the posterior midline  Digital rectal examination reveals normal rectal tone. No signs of abscess or abnormal masses; pt does have pain at the posterior midline but no obvious fissure seen.  There are no signs suggestive of malignancy on examination.  There are internal hemorrhoids, noted some blood of anoscopy but no active bleeding noted form any of the hemorrhoids pillar - internal hemorrhoids grade 1-2.   Pt has not had a colonoscopy - will get schedule soon.      Musculoskeletal: No spinal or CVA tenderness. Full range of motion in the upper and lower extremities.    Skin: No skin rashes or lesions to inspection.  No petechia.    Neurologic: Cranial nerves II through XII are grossly intact and symmetric.  Psychiatric: The patient is alert and oriented times 3.  The patient's affect is not blunted and mood is appropriate.          Data:   WBC -   WBC   Date Value Ref Range Status   01/17/2018 6.1 4.0 - 11.0 10e9/L Final   ], HgB -   Hemoglobin   Date Value Ref Range Status   01/17/2018 14.9 13.3 - 17.7 g/dL Final   ]   Liver Function Studies -   Recent Labs   Lab Test  03/05/16   1004   PROTTOTAL  7.6   ALBUMIN  4.7   BILITOTAL  1.2   ALKPHOS  97   AST   9   ALT  22     No results found for this or any previous visit (from the past 744 hour(s)).     Tanya Benitez DO 1/17/2018 10:20 AM         Please schedule for surgery, pre op H&P, and post ops.    Surgery:  Patient Name:  Ronnie Jackson ([id number])  Procedure:   Diagnostic colonoscopy, exam under anesthesia, possible of internal hemorrhoids  Diagnosis:    Bright red blood per rectum, internal hemorrhoids, anal/rectal pain   Assistant: Single scrub tech  Surgeon:  Tanya Benitez MD  Anesthesia:  MAC  PT type:  Same Day Surgery  Time needed: 60 minutes  Patient position:  left lateral recumbant  Mini fluoro:  No  C-arm:  no  Equipment:  n/a  Anticoagulation:  No  Vendor:  no  Surgeon Notes:     Post op appts:    3 weeks from this week    Expected work restrictions:  No restrictions    [facility] Discussed:  Not Applicable

## 2018-01-18 DIAGNOSIS — R19.5 MUCOUS IN STOOLS: ICD-10-CM

## 2018-01-18 LAB — LACTOFERRIN STL QL IA: NEGATIVE

## 2018-01-18 PROCEDURE — 87506 IADNA-DNA/RNA PROBE TQ 6-11: CPT | Performed by: PHYSICIAN ASSISTANT

## 2018-01-18 PROCEDURE — 83630 LACTOFERRIN FECAL (QUAL): CPT | Performed by: PHYSICIAN ASSISTANT

## 2018-01-19 ASSESSMENT — ANXIETY QUESTIONNAIRES: GAD7 TOTAL SCORE: 21

## 2018-01-27 ENCOUNTER — ANESTHESIA EVENT (OUTPATIENT)
Dept: GASTROENTEROLOGY | Facility: CLINIC | Age: 28
End: 2018-01-27

## 2018-01-27 ASSESSMENT — ENCOUNTER SYMPTOMS: SEIZURES: 0

## 2018-01-27 ASSESSMENT — LIFESTYLE VARIABLES: TOBACCO_USE: 0

## 2018-01-27 NOTE — ANESTHESIA PREPROCEDURE EVALUATION
Anesthesia Evaluation     . Pt has not had prior anesthetic            ROS/MED HX    ENT/Pulmonary:  - neg pulmonary ROS    (-) tobacco use   Neurologic:  - neg neurologic ROS    (-) seizures and migraines   Cardiovascular:  - neg cardiovascular ROS   (+) ----. : . . . :. . No previous cardiac testing      (-) hypertension, CAD and dyslipidemia   METS/Exercise Tolerance:     Hematologic:  - neg hematologic  ROS       Musculoskeletal:  - neg musculoskeletal ROS       GI/Hepatic:     (+) GERD Asymptomatic on medication, bowel prep,       Renal/Genitourinary:  - ROS Renal section negative       Endo:  - neg endo ROS       Psychiatric:     (+) psychiatric history anxiety      Infectious Disease:  - neg infectious disease ROS       Malignancy:      - no malignancy   Other:    - neg other ROS                 Physical Exam  Normal systems: cardiovascular, pulmonary and dental    Airway   Mallampati: II  TM distance: >3 FB  Neck ROM: full    Dental     Cardiovascular   Rhythm and rate: regular and normal      Pulmonary    breath sounds clear to auscultation                    Anesthesia Plan      History & Physical Review  History and physical reviewed and following examination; no interval change.    ASA Status:  2 .    NPO Status:  > 8 hours    Plan for MAC with Intravenous and Propofol induction. Maintenance will be TIVA.  Reason for MAC:  Deep or markedly invasive procedure (G8)  PONV prophylaxis:  Ondansetron (or other 5HT-3)       Postoperative Care  Postoperative pain management:  IV analgesics.      Consents  Anesthetic plan, risks, benefits and alternatives discussed with:  Patient.  Use of blood products discussed: No .   .                          .

## 2018-01-29 ENCOUNTER — ANESTHESIA (OUTPATIENT)
Dept: GASTROENTEROLOGY | Facility: CLINIC | Age: 28
End: 2018-01-29

## 2018-01-29 NOTE — TELEPHONE ENCOUNTER
I'll see him again for the vomiting up blood if possible; since he'll likely need an upper scope also.  See if you can fit him in sometimes this week (tomorrow or Wednesday.)    Thanks    Elena

## 2018-01-29 NOTE — TELEPHONE ENCOUNTER
Patient stated that he didn't receive his prep. States that he tried looking online for how to do the colonoscopy prep but everything said that he should contact his physician. He also states that that he did not vomit blood. He states that he is still not feeling the best and thinks that he has a stomach bug. He said that he is not having much blood when he has a BM. He said that sometimes there is a little bit on the toilet paper when he wipes. He is wondering if he needs to still have the colonoscopy. He also states that when you did an exam there was spot you touched and it hurt. He said that that pain is gone now but he wonders what it was and why it hurt. Has other questions. Are you able to call him?

## 2018-01-29 NOTE — TELEPHONE ENCOUNTER
Patient called to cancel surgery per Shandra in the OR. Patient states that he was vomiting up blood. Dr. Benitez, would you like to see patient again or proceed with scheduling?

## 2018-01-31 ENCOUNTER — TELEPHONE (OUTPATIENT)
Dept: SURGERY | Facility: CLINIC | Age: 28
End: 2018-01-31

## 2018-01-31 NOTE — TELEPHONE ENCOUNTER
Reason for Call:  Other appointment    Detailed comments: Patient wasn't able to go through with Colonoscopy last week. Wondering what Dr. Benitez would like him to do next? Please call him.    Phone Number Patient can be reached at: Home number on file [phone] (home)     Best Time: any    Can we leave a detailed message on this number? YES    Call taken on 1/31/2018 at 2:04 PM by Monik Hurt

## 2018-02-01 NOTE — TELEPHONE ENCOUNTER
Called patient.   Pt stated he has not been vomiting up blood.  He wasn't feeling well, thus could not finish his prep.  Stated hes feeling better on the fiber regimen.  Not having the rectal pain.  But noted that when he's not on fiber, the symptoms returns.  Having blood only on the toilet paper and not in the toilet.  Pt wants to get his colonoscopy reschedule.      Please send him a new info packet and more prep for his colonoscopy.     Please reschedule surgery and post-op    Surgery:  Patient Name:  Ronnie Jackson ([id number])  Procedure:   Exam under anesthesia, colonscopy with possible hemorrhoid banding, possible biopsy, possible polypectomy  Diagnosis:    Rectal bleeding and rectal pain   Assistant: None  Surgeon:  Tanya Benitez MD  Anesthesia:  MAC  PT type:  Same Day Surgery  Time needed: 60 minutes  Patient position:  left lateral recumbant  Mini fluoro:  No  C-arm:  no  Equipment:  n/a  Anticoagulation:  No  Vendor:  no  Surgeon Notes:     Post op appts:    5-7 days po    Expected work restrictions:  No restriction    [facility] Discussed:  NO

## 2018-02-01 NOTE — TELEPHONE ENCOUNTER
Surgery Scheduled    Date of Surgery 2/19/18 Time of Surgery   Procedure: Exam under anesthesia, colonoscopy with possible hemorrhoid banding, possible biopsy, possible polypectomy  Hospital/Surgical Facility: [facility]  Surgeon: Dr. Benitez  Type of Anesthesia : MAC  Pre-op NA with   Post op:2/27/18 with Dr. Benitez        Surgery packet mailed to patient's home address. Patients instructed to arrive 1 hours prior to surgery. Patient understood and agrees to plan.    Celine Rodriguez  Surgery Scheduler

## 2018-02-19 ENCOUNTER — ANESTHESIA (OUTPATIENT)
Dept: GASTROENTEROLOGY | Facility: CLINIC | Age: 28
End: 2018-02-19
Payer: COMMERCIAL

## 2018-02-19 ENCOUNTER — HOSPITAL ENCOUNTER (OUTPATIENT)
Facility: CLINIC | Age: 28
Discharge: HOME | End: 2018-02-19
Attending: SURGERY | Admitting: SURGERY
Payer: COMMERCIAL

## 2018-02-19 ENCOUNTER — ANESTHESIA EVENT (OUTPATIENT)
Dept: GASTROENTEROLOGY | Facility: CLINIC | Age: 28
End: 2018-02-19
Payer: COMMERCIAL

## 2018-02-19 ENCOUNTER — SURGERY (OUTPATIENT)
Age: 28
End: 2018-02-19

## 2018-02-19 VITALS
DIASTOLIC BLOOD PRESSURE: 40 MMHG | WEIGHT: 140 LBS | TEMPERATURE: 98.6 F | HEIGHT: 70 IN | RESPIRATION RATE: 18 BRPM | OXYGEN SATURATION: 100 % | SYSTOLIC BLOOD PRESSURE: 98 MMHG | BODY MASS INDEX: 20.04 KG/M2

## 2018-02-19 LAB — COLONOSCOPY: NORMAL

## 2018-02-19 PROCEDURE — 25000128 H RX IP 250 OP 636: Performed by: NURSE ANESTHETIST, CERTIFIED REGISTERED

## 2018-02-19 PROCEDURE — 25000125 ZZHC RX 250: Performed by: NURSE ANESTHETIST, CERTIFIED REGISTERED

## 2018-02-19 PROCEDURE — 88305 TISSUE EXAM BY PATHOLOGIST: CPT | Mod: 26 | Performed by: SURGERY

## 2018-02-19 PROCEDURE — 45380 COLONOSCOPY AND BIOPSY: CPT | Performed by: SURGERY

## 2018-02-19 PROCEDURE — 40000296 ZZH STATISTIC ENDO RECOVERY CLASS 1:2 FIRST HOUR: Performed by: SURGERY

## 2018-02-19 PROCEDURE — 88305 TISSUE EXAM BY PATHOLOGIST: CPT | Performed by: SURGERY

## 2018-02-19 RX ORDER — PROPOFOL 10 MG/ML
INJECTION, EMULSION INTRAVENOUS CONTINUOUS PRN
Status: DISCONTINUED | OUTPATIENT
Start: 2018-02-19 | End: 2018-02-19

## 2018-02-19 RX ORDER — PROPOFOL 10 MG/ML
INJECTION, EMULSION INTRAVENOUS PRN
Status: DISCONTINUED | OUTPATIENT
Start: 2018-02-19 | End: 2018-02-19

## 2018-02-19 RX ORDER — LIDOCAINE HYDROCHLORIDE 20 MG/ML
INJECTION, SOLUTION INFILTRATION; PERINEURAL PRN
Status: DISCONTINUED | OUTPATIENT
Start: 2018-02-19 | End: 2018-02-19

## 2018-02-19 RX ORDER — SODIUM CHLORIDE, SODIUM LACTATE, POTASSIUM CHLORIDE, CALCIUM CHLORIDE 600; 310; 30; 20 MG/100ML; MG/100ML; MG/100ML; MG/100ML
INJECTION, SOLUTION INTRAVENOUS CONTINUOUS
Status: DISCONTINUED | OUTPATIENT
Start: 2018-02-19 | End: 2018-02-19 | Stop reason: HOSPADM

## 2018-02-19 RX ORDER — LIDOCAINE 40 MG/G
CREAM TOPICAL
Status: DISCONTINUED | OUTPATIENT
Start: 2018-02-19 | End: 2018-02-19 | Stop reason: HOSPADM

## 2018-02-19 RX ADMIN — PROPOFOL 50 MG: 10 INJECTION, EMULSION INTRAVENOUS at 14:04

## 2018-02-19 RX ADMIN — PROPOFOL 10 MG: 10 INJECTION, EMULSION INTRAVENOUS at 14:25

## 2018-02-19 RX ADMIN — PROPOFOL 30 MG: 10 INJECTION, EMULSION INTRAVENOUS at 14:26

## 2018-02-19 RX ADMIN — LIDOCAINE HYDROCHLORIDE 40 MG: 20 INJECTION, SOLUTION INFILTRATION; PERINEURAL at 14:03

## 2018-02-19 RX ADMIN — PROPOFOL 30 MG: 10 INJECTION, EMULSION INTRAVENOUS at 14:07

## 2018-02-19 RX ADMIN — PROPOFOL 40 MG: 10 INJECTION, EMULSION INTRAVENOUS at 14:31

## 2018-02-19 RX ADMIN — PROPOFOL 150 MCG/KG/MIN: 10 INJECTION, EMULSION INTRAVENOUS at 14:05

## 2018-02-19 RX ADMIN — LIDOCAINE HYDROCHLORIDE 1 ML: 10 INJECTION, SOLUTION INFILTRATION; PERINEURAL at 13:08

## 2018-02-19 RX ADMIN — PROPOFOL 30 MG: 10 INJECTION, EMULSION INTRAVENOUS at 14:17

## 2018-02-19 RX ADMIN — PROPOFOL 30 MG: 10 INJECTION, EMULSION INTRAVENOUS at 14:16

## 2018-02-19 RX ADMIN — PROPOFOL 50 MG: 10 INJECTION, EMULSION INTRAVENOUS at 14:05

## 2018-02-19 RX ADMIN — SODIUM CHLORIDE, POTASSIUM CHLORIDE, SODIUM LACTATE AND CALCIUM CHLORIDE: 600; 310; 30; 20 INJECTION, SOLUTION INTRAVENOUS at 13:08

## 2018-02-19 RX ADMIN — PROPOFOL 30 MG: 10 INJECTION, EMULSION INTRAVENOUS at 14:10

## 2018-02-19 ASSESSMENT — LIFESTYLE VARIABLES: TOBACCO_USE: 0

## 2018-02-19 NOTE — ANESTHESIA CARE TRANSFER NOTE
Patient: Ronnie Jackson    Procedure(s):  exam under anesthesia, colonoscopy with forcep  polypectomy - Wound Class: II-Clean Contaminated   - Wound Class: II-Clean Contaminated    Diagnosis: rectal bleeding and rectal pain  Diagnosis Additional Information: No value filed.    Anesthesia Type:   MAC     Note:  Airway :Face Mask  Patient transferred to:Phase II  Handoff Report: Identifed the Patient, Identified the Reponsible Provider, Reviewed the pertinent medical history, Discussed the surgical course, Reviewed Intra-OP anesthesia mangement and issues during anesthesia, Set expectations for post-procedure period and Allowed opportunity for questions and acknowledgement of understanding      Vitals: (Last set prior to Anesthesia Care Transfer)    CRNA VITALS  2/19/2018 1405 - 2/19/2018 1441      2/19/2018             NIBP: 104/61    Pulse: 68    NIBP Mean: 75    SpO2: 97 %                Electronically Signed By: OLI Boo CRNA  February 19, 2018  2:41 PM

## 2018-02-19 NOTE — IP AVS SNAPSHOT
MRN:[id number]                      After Visit Summary   2/19/2018    Ronnie Jackson    MRN: [id number]           Thank you!     Thank you for choosing [facility] for your care. Our goal is always to provide you with excellent care. Hearing back from our patients is one way we can continue to improve our services. Please take a few minutes to complete the written survey that you may receive in the mail after you visit with us. Thank you!        Patient Information     Date Of Birth          1990        About your hospital stay     You were admitted on:  February 19, 2018 You last received care in the:  [facility]    You were discharged on:  February 19, 2018       Who to Call     For medical emergencies, please call 911.  For non-urgent questions about your medical care, please call your primary care provider or clinic, None  For questions related to your surgery, please call your surgery clinic        Attending Provider     Provider Specialty    Eli, MD [facility]       Primary Care Provider Fax #    Physician No Ref-Primary [phone]      Your next 10 appointments already scheduled     Feb 27, 2018 11:00 AM CST   [facility]   [facility]    [address] [place] [id number] [phone]              Further instructions from your care team         [facility]    Home Care Following Endoscopy          Activity:    You have just undergone an endoscopic procedure usually performed with conscious sedation.  Do not work or operate machinery (including a car) for at least 12 hours.      I encourage you to walk and attempt to pass this air as soon as possible.    Diet:    Return to the diet you were on before your procedure but eat lightly for the first 12-24 hours.    Drink plenty of water.    Resume any regular medications unless otherwise advised by your physician.  Please  "begin any new medication prescribed as a result of your procedure as directed by your physician.     If you had any biopsy or polyp removed please refrain from aspirin or aspirin products for 2 days.  If on Coumadin please restart as instructed by your physician.   Pain:    You may take Tylenol as needed for pain.  Expected Recovery:    You can expect some mild abdominal fullness and/or discomfort due to the air used to inflate your intestinal tract. It is also normal to have a mild sore throat after upper endoscopy.    Call Your Physician if You Have:    After Colonoscopy:  o Worsening persisting abdominal pain which is worse with activity.  o Fevers (>101 degrees F), chills or shakes.  o Passage of continued blood with bowel movements.   Any questions or concerns about your recovery, please call [phone] or after hours [phone] Nurse Advice Line.    Follow-up Care:    You should receive a call or letter with your results within 1 week. Please call if you have not received a notification of your results.    If asked to return to clinic please make an appointment 1 week after your procedure.  Call [phone]. (Note:  Dr. Benitez requests a return appointment if specimens were taken.)        Pending Results     Date and Time Order Name Status Description    2/19/2018 1425 Surgical pathology exam In process             Admission Information     Date & Time Provider Department Dept. Phone    2/19/2018 Tanya Benitez MD [facility] [phone]      Your Vitals Were     Blood Pressure Temperature Respirations Height Weight Pulse Oximetry    98/40 98.6  F (37  C) (Oral) 18 1.778 m (5' 10\") 63.5 kg (140 lb) 100%    BMI (Body Mass Index)                   20.09 kg/m2           [employer] Information     [employer] lets you send messages to your doctor, view your test results, renew your prescriptions, schedule appointments and more. To sign up, go to [email or web address] . Click on \"Log in\" on the left " "side of the screen, which will take you to the Welcome page. Then click on \"Sign up Now\" on the right side of the page.     You will be asked to enter the access code listed below, as well as some personal information. Please follow the directions to create your username and password.     Your access code is: [email or web address]  Expires: 2018  9:16 AM     Your access code will  in 90 days. If you need help or a new code, please call your [facility] clinic or [phone].        Care EveryWhere ID     This is your Care EveryWhere ID. This could be used by other organizations to access your [facility] medical records  [id number]        Equal Access to Services     BRIAN BALLARD : Aubree Watts, yu jones, thaddeus motaalpaco redding, [employer]. So [facility] [phone].    ATENCIÓN: Si habla español, tiene a allen disposición servicios gratuitos de asistencia lingüística. Llame al [phone].    We comply with applicable federal civil rights laws and [place] laws. We do not discriminate on the basis of race, color, national origin, age, disability, sex, sexual orientation, or gender identity.               Review of your medicines      UNREVIEWED medicines. Ask your doctor about these medicines        Dose / Directions    CLONAZEPAM PO        Dose:  0.5 mg   Take 0.5 mg by mouth 3 times daily as needed for anxiety   Refills:  0       CONCERTA 36 MG CR tablet   Generic drug:  methylphenidate ER        Refills:  0       ELAVIL PO   Indication:  Trouble Sleeping        Dose:  10 %   Take 10 % by mouth nightly as needed for sleep (1 - 2 tablets as needed at bedtime)   Refills:  0       gabapentin 300 MG capsule   Commonly known as:  NEURONTIN        Dose:  300 mg   Take 300 mg by mouth 3 times daily   Refills:  0       guanFACINE 2 MG tablet   Commonly known as:  TENEX        Dose:  2 mg   Take 2 mg by mouth At Bedtime   Refills:  0       LEXAPRO 10 MG tablet   Generic " drug:  escitalopram        Dose:  50 mg   Take 50 mg by mouth daily   Refills:  0       omeprazole 20 MG CR capsule   Commonly known as:  priLOSEC   Used for:  Gastroesophageal reflux disease, esophagitis presence not specified        Dose:  20 mg   Take 1 capsule (20 mg) by mouth daily   Quantity:  30 capsule   Refills:  1       TEMAZEPAM PO        Refills:  0                Protect others around you: Learn how to safely use, store and throw away your medicines at www.disposemymeds.org.             Medication List: This is a list of all your medications and when to take them. Check marks below indicate your daily home schedule. Keep this list as a reference.      Medications           Morning Afternoon Evening Bedtime As Needed    CLONAZEPAM PO   Take 0.5 mg by mouth 3 times daily as needed for anxiety                                CONCERTA 36 MG CR tablet   Generic drug:  methylphenidate ER                                ELAVIL PO   Take 10 % by mouth nightly as needed for sleep (1 - 2 tablets as needed at bedtime)                                gabapentin 300 MG capsule   Commonly known as:  NEURONTIN   Take 300 mg by mouth 3 times daily                                guanFACINE 2 MG tablet   Commonly known as:  TENEX   Take 2 mg by mouth At Bedtime                                LEXAPRO 10 MG tablet   Take 50 mg by mouth daily   Generic drug:  escitalopram                                omeprazole 20 MG CR capsule   Commonly known as:  priLOSEC   Take 1 capsule (20 mg) by mouth daily                                TEMAZEPAM PO

## 2018-02-19 NOTE — IP AVS SNAPSHOT
[facility]    [address] [address]    Phone:  [phone]                                       After Visit Summary   2/19/2018    Ronnie Jackson    MRN: [id number]           After Visit Summary Signature Page     I have received my discharge instructions, and my questions have been answered. I have discussed any challenges I see with this plan with the nurse or doctor.    ..........................................................................................................................................  Patient/Patient Representative Signature      ..........................................................................................................................................  Patient Representative Print Name and Relationship to Patient    ..................................................               ................................................  Date                                            Time    ..........................................................................................................................................  Reviewed by Signature/Title    ...................................................              ..............................................  Date                                                            Time

## 2018-02-19 NOTE — DISCHARGE INSTRUCTIONS
[facility]    Home Care Following Endoscopy          Activity:    You have just undergone an endoscopic procedure usually performed with conscious sedation.  Do not work or operate machinery (including a car) for at least 12 hours.      I encourage you to walk and attempt to pass this air as soon as possible.    Diet:    Return to the diet you were on before your procedure but eat lightly for the first 12-24 hours.    Drink plenty of water.    Resume any regular medications unless otherwise advised by your physician.  Please begin any new medication prescribed as a result of your procedure as directed by your physician.     If you had any biopsy or polyp removed please refrain from aspirin or aspirin products for 2 days.  If on Coumadin please restart as instructed by your physician.   Pain:    You may take Tylenol as needed for pain.  Expected Recovery:    You can expect some mild abdominal fullness and/or discomfort due to the air used to inflate your intestinal tract. It is also normal to have a mild sore throat after upper endoscopy.    Call Your Physician if You Have:    After Colonoscopy:  o Worsening persisting abdominal pain which is worse with activity.  o Fevers (>101 degrees F), chills or shakes.  o Passage of continued blood with bowel movements.   Any questions or concerns about your recovery, please call [phone] or after hours [phone] Nurse Advice Line.    Follow-up Care:    You should receive a call or letter with your results within 1 week. Please call if you have not received a notification of your results.    If asked to return to clinic please make an appointment 1 week after your procedure.  Call [phone]. (Note:  Dr. Benitez requests a return appointment if specimens were taken.)

## 2018-02-19 NOTE — H&P
[facility]    Pre-Endoscopy History and Physical     Ronnie Jackson MRN# [id number]   YOB: 1990 Age: 27 year old     Date of Procedure: 2/19/2018  Primary care provider: No Ref-Primary, Physician  Type of Endoscopy: Colonoscopy with possible biopsy, possible polypectomy and exam under anesthesia, possible internal banding  Reason for Procedure: rectal pain and BRBPR  Type of Anesthesia Anticipated: Conscious Sedation and MAC    HPI:    Ronnie is a 27 year old male who will be undergoing the above procedure.      Reported BRBPR especially when he wipes - every other day.  Reported rectal pain which radiates into back daily.  Using fiber with some improvement of bleeding.  No famhx of colon cancer.      A history and physical has been performed. The patient's medications and allergies have been reviewed. The risks and benefits of the procedure and the sedation options and risks were discussed with the patient.  All questions were answered and informed consent was obtained.      He denies a personal or family history of anesthesia complications or bleeding disorders.     Patient Active Problem List   Diagnosis     Anxiety     Recurring abdominal pain     Insomnia        Past Medical History:   Diagnosis Date     Anxiety      Rectal bleeding      Recurring abdominal pain         History reviewed. No pertinent surgical history.    Social History   Substance Use Topics     Smoking status: Current Every Day Smoker     Packs/day: 0.50     Smokeless tobacco: Never Used      Comment: trying to quit     Alcohol use No       History reviewed. No pertinent family history.    Prior to Admission medications    Medication Sig Start Date End Date Taking? Authorizing Provider   Amitriptyline HCl (ELAVIL PO) Take 10 % by mouth nightly as needed for sleep (1 - 2 tablets as needed at bedtime)   Yes Reported, Patient   methylphenidate ER (CONCERTA) 36 MG CR tablet    Yes Reported, Patient   gabapentin  "(NEURONTIN) 300 MG capsule Take 300 mg by mouth 3 times daily   Yes Reported, Patient   TEMAZEPAM PO    Yes Reported, Patient   escitalopram (LEXAPRO) 10 MG tablet Take 50 mg by mouth daily   Yes Reported, Patient   guanFACINE (TENEX) 2 MG tablet Take 2 mg by mouth At Bedtime   Yes Reported, Patient   CLONAZEPAM PO Take 0.5 mg by mouth 3 times daily as needed for anxiety   Yes Reported, Patient   omeprazole (PRILOSEC) 20 MG CR capsule Take 1 capsule (20 mg) by mouth daily 1/17/18   Dave Shipman PA-C       No Known Allergies     REVIEW OF SYSTEMS:   5 point ROS negative except as noted above in HPI, including Gen., Resp., CV, GI &  system review.    PHYSICAL EXAM:   /73  Temp 98.6  F (37  C) (Oral)  Resp 16  Ht 1.778 m (5' 10\")  Wt 63.5 kg (140 lb)  SpO2 100%  BMI 20.09 kg/m2 Estimated body mass index is 20.09 kg/(m^2) as calculated from the following:    Height as of this encounter: 1.778 m (5' 10\").    Weight as of this encounter: 63.5 kg (140 lb).   Constitutional: Awake, alert, no acute distress.  Eyes: No scleral icterus.  Conjunctiva are without injection.  ENMT: Mucous membranes moist, dentition and gums are intact.   Neck: Soft, supple, trachea midline.    Endocrine: The thyroid is without masses and mobile with swallow.   Lymphatic: There is no cervical, submandibular, supraclavicular/infraclavicular, axillary, or inguinal adenopathy.  Respiratory: Lungs are clear to auscultation and percussion bilaterally.   Cardiovascular: Regular rate and rhythm. No murmurs, rubs, or gallops.    Abdomen: Non-distended, non-tender, normoactive bowel sounds present, No masses, umbilical or inguinal hernias, or flank tenderness. No hepatosplenomegaly.   Musculoskeletal: No spinal or CVA tenderness. Full range of motion in the upper and lower extremities.    Skin: No skin rashes or lesions to inspection.  No petechia.    Neurologic: Cranial nerves II through XII are grossly intact and " symmetric.  Psychiatric: The patient is alert and oriented times 3.  The patient's affect is not blunted and mood is appropriate.  DIAGNOSTICS:    Not indicated    IMPRESSION   ASA Class 2 - Mild systemic disease    PLAN:   Plan for Colonoscopy with possible biopsy, possible polypectomy and exam under anesthesia possible internal banding of internal hemorrhoids. We discussed the risks, benefits and alternatives and the patient wished to proceed.  Patient is cleared for the above procedure.    The above has been forwarded to the consulting provider.    [facility], [facility]

## 2018-02-19 NOTE — OP NOTE
Op report    Ronnie Jackson MRN# [id number]   Age: 27 year old YOB: 1990            Exam Under Anesthesia:      Indication: rectal pain; BRBPR     Consent: Informed consent was obtained from the parent(s), see scanned form.      Pause for the cause: Right patient: Yes      Right body part: Yes      Right procedure Yes  Anesthesia:    MAC    Pre-procedure:   Colonoscopy with biopsy with exam under anesthesia with possible banding of internal hemorrhoids    Procedure:   Colonoscopy with biopsy  Exam under anesthesia    Complications:   None    Procedure in Detail:   Ronnie Jackson 27 year old [id number] was placed in the knee to chest position in the endoscopy room after his colonoscopy with biopsy. External examination does not reveal external hemorrhoids, skin tags, or fistulas. The patient has pain the Posterior midline.  No fissure noted within rectum or anus.  Stage 1 internal hemorrhoids, not bleeding.  Entire rectal mucusa and sigmoid mucosa erythematous and irritated.  Biopsies 2x were done with biopsy forceps; please see colonoscopy op for details.  No abscess or fistula noted  Digital rectal examination reveals normal rectal tone. There are no signs suggestive of malignancy on examination.     No current outpatient prescriptions on file.     No Known Allergies    Plan:   Await final pathology for biopsy  No abx indicated  Will get GI consult once follow-up in office    [facility], [facility]

## 2018-02-19 NOTE — ANESTHESIA POSTPROCEDURE EVALUATION
Patient: Ronnie Jackson    Procedure(s):  exam under anesthesia, colonoscopy with forcep  polypectomy - Wound Class: II-Clean Contaminated   - Wound Class: II-Clean Contaminated    Diagnosis:rectal bleeding and rectal pain  Diagnosis Additional Information: No value filed.    Anesthesia Type:  MAC    Note:  Anesthesia Post Evaluation    Patient location during evaluation: Phase 2  Patient participation: Able to fully participate in evaluation  Level of consciousness: awake and alert  Pain management: adequate  Airway patency: patent  Cardiovascular status: acceptable  Respiratory status: acceptable  Hydration status: acceptable  PONV: none     Anesthetic complications: None          Last vitals:  Vitals:    02/19/18 1301   BP: 124/73   Resp: 16   Temp: 98.6  F (37  C)   SpO2: 100%         Electronically Signed By: OLI Boo CRNA  February 19, 2018  2:42 PM

## 2018-02-19 NOTE — ANESTHESIA PREPROCEDURE EVALUATION
Anesthesia Evaluation     . Pt has not had prior anesthetic            ROS/MED HX    ENT/Pulmonary:  - neg pulmonary ROS    (-) tobacco use   Neurologic:  - neg neurologic ROS     Cardiovascular:  - neg cardiovascular ROS   (+) ----. : . . . :. . No previous cardiac testing       METS/Exercise Tolerance:     Hematologic:  - neg hematologic  ROS       Musculoskeletal:  - neg musculoskeletal ROS       GI/Hepatic:     (+) GERD Asymptomatic on medication, bowel prep,       Renal/Genitourinary:  - ROS Renal section negative       Endo:  - neg endo ROS       Psychiatric: Comment: Panic disorder    (+) psychiatric history anxiety      Infectious Disease:  - neg infectious disease ROS       Malignancy:      - no malignancy   Other:    - neg other ROS                 Physical Exam  Normal systems: cardiovascular, pulmonary and dental    Airway   Mallampati: II  TM distance: >3 FB  Neck ROM: full    Dental     Cardiovascular   Rhythm and rate: regular and normal      Pulmonary    breath sounds clear to auscultation                        Anesthesia Plan      History & Physical Review  History and physical reviewed and following examination; no interval change.    ASA Status:  2 .    NPO Status:  > 8 hours and > 4 hours    Plan for MAC with Intravenous and Propofol induction. Maintenance will be TIVA.  Reason for MAC:  Deep or markedly invasive procedure (G8)         Postoperative Care  Postoperative pain management:  IV analgesics.      Consents  Anesthetic plan, risks, benefits and alternatives discussed with:  Patient..                          .

## 2018-02-22 LAB — COPATH REPORT: NORMAL

## 2018-03-12 ENCOUNTER — OFFICE VISIT (OUTPATIENT)
Dept: SURGERY | Facility: OTHER | Age: 28
End: 2018-03-12
Payer: COMMERCIAL

## 2018-03-12 ENCOUNTER — TELEPHONE (OUTPATIENT)
Dept: SURGERY | Facility: OTHER | Age: 28
End: 2018-03-12

## 2018-03-12 VITALS
HEART RATE: 93 BPM | SYSTOLIC BLOOD PRESSURE: 116 MMHG | BODY MASS INDEX: 20.95 KG/M2 | RESPIRATION RATE: 16 BRPM | TEMPERATURE: 98.6 F | OXYGEN SATURATION: 98 % | DIASTOLIC BLOOD PRESSURE: 70 MMHG | WEIGHT: 146 LBS

## 2018-03-12 DIAGNOSIS — K64.8 INTERNAL HEMORRHOIDS: ICD-10-CM

## 2018-03-12 DIAGNOSIS — F41.9 ANXIETY: ICD-10-CM

## 2018-03-12 DIAGNOSIS — K62.89 RECTAL PAIN: Primary | ICD-10-CM

## 2018-03-12 PROCEDURE — 99214 OFFICE O/P EST MOD 30 MIN: CPT | Performed by: SURGERY

## 2018-03-12 RX ORDER — HYDROCORTISONE ACETATE 25 MG/1
25 SUPPOSITORY RECTAL 2 TIMES DAILY
Qty: 28 SUPPOSITORY | Refills: 1 | Status: SHIPPED | OUTPATIENT
Start: 2018-03-12

## 2018-03-12 ASSESSMENT — PAIN SCALES - GENERAL: PAINLEVEL: MODERATE PAIN (5)

## 2018-03-12 NOTE — PROGRESS NOTES
"[facility] FOLLOW-UP NOTE  GENERAL SURGERY    PCP: No Ref-Primary, Physician         Assessment and Plan:      Ronnie Jackson is a 27 year old male who presented post operatively from 2/19/2018 for colonoscopy and exam under anesthesia and is doing adequately.       ICD-10-CM    1. Rectal pain K62.89    2. Internal hemorrhoids K64.8    3. Anxiety F41.9        I reviewed the pathology report today with the patient and answered all questions.    Pt still complaining of rectal pain that radiates into his back, blood per rectum when he wipes, and describing \"tissue\" that comes out of rectum when he has a BM and that he has to push that back in.  He's also very concern his symptoms may be relating to his kidneys, prostate, bladder, and cancer.  He's tells me that he has been taking 20g of fiber daily and drinking plenty of fluid but then stated that the symptoms above worsen when he's constipated and straining.  I reassured the patient that he does not have rectal prolapse, these symptoms are unlikely related to his kidneys, prostate, or bladder, and that I did not see any \"cancer\" or polyps during his colonoscopy.  I reassured that that he does have stage 1 maybe stage 2 internal hemorrhoids but no external hemorrhoids and no anal fissures; the bleeding from his rectum likely due to the hyperemeic nature of his sigmoid colon, rectum and anus and not from the internal hemorrhoids.  I'm unsure of the etiology at this point.  The sigmoid biopsy of the inflamed tissue showed \"normal mucosa\" on final pathology.  I am not sure of this \"tissue\" that the pt has to \"push\" back in after his BM.  I offer to look again during this visit as pt insisted that he has to \"push\" tissue back in after his BM.  However, he decline another look in office exam and would like a second opinion.      Thus, I recommend that pt use anusol suppository to help with the inflammation, will place referral to colorectal surgeon for second opinion, " "pt to stop using his \"herbal\" supplement, and to continue his fiber/increased fluid regimen.      40 mins visit, more than 50% of face to face time was spent in counseling and coordinating care as discussed above.           Subjective:     Ronnie Jackson is a 27 year old male who presents post operatively from 2/19/2018 for colonoscopy and exam under anesthesia and is doing adequately. Pain has been controled. Currently is not using pain medications. Eating a Regular and having regular bladder/bowel function. Overall doing adequately.           Objective:     Skin: negative, scaling, itching, bruising  Ears/Nose/Throat: negative  Respiratory: No shortness of breath, dyspnea on exertion, cough, or hemoptysis  Cardiovascular: palpitations, tachycardia and irregular heart beat  Gastrointestinal: positive for abdominal pain, hemorrhoids and hematochezia  Genitourinary: positive for frequency  Musculoskeletal: positive for back pain  Neurologic: negative, syncope, stroke and seizures  Hematologic/Lymphatic/Immunologic: negative  Endocrine: negative    /70 (BP Location: Right arm, Patient Position: Chair, Cuff Size: Adult Large)  Pulse 93  Temp 98.6  F (37  C) (Tympanic)  Resp 16  Wt 66.2 kg (146 lb)  SpO2 98%  BMI 20.95 kg/m2   Constitutional: Awake, alert, in no acute distress.  Respiratory: Non-labored.   Cardiovascular: Regular rate and rhythm.   Abdomen: Soft, ND, NT.    [facility], [facility] General Surgery            "

## 2018-03-12 NOTE — MR AVS SNAPSHOT
After Visit Summary   3/12/2018    Ronnie Jackson    MRN: [id number]           Patient Information     Date Of Birth          1990        Visit Information        Provider Department      3/12/2018 8:15 AM Tanya Benitez MD [facility]        Today's Diagnoses     Rectal pain    -  1    Internal hemorrhoids        Anxiety           Follow-ups after your visit        Additional Services     COLORECTAL SURGERY REFERRAL       Your provider has referred you to: [facility] colorectal surgery    Referral Reason(s): Hemorrhoids and rectal pain  Special Concerns: anxiety and thinks he has rectal prolapse and cancer  This referral is: Elective (week +)  It is OK to leave a message on patient's voicemail.    Please be aware that coverage of these services is subject to the terms and limitations of your health insurance plan.  Call member services at your health plan with any benefit or coverage questions.      Please bring the following with you to your appointment:    (1) Any X-Rays, CTs or MRIs which have been performed.  Contact the facility where they were done to arrange for  prior to your scheduled appointment.    (2) List of current medications  (3) This referral request   (4) Any documents/labs given to you for this referral                  Who to contact     If you have questions or need follow up information about today's clinic visit or your schedule please contact [facility] directly at [phone].  Normal or non-critical lab and imaging results will be communicated to you by MyChart, letter or phone within 4 business days after the clinic has received the results. If you do not hear from us within 7 days, please contact the clinic through MyChart or phone. If you have a critical or abnormal lab result, we will notify you by phone as soon as possible.  Submit refill requests through [employer] or call your pharmacy and they will forward the refill request to  "us. Please allow 3 business days for your refill to be completed.          Additional Information About Your Visit        [employer] Information     [employer] lets you send messages to your doctor, view your test results, renew your prescriptions, schedule appointments and more. To sign up, go to [email or web address] . Click on \"Log in\" on the left side of the screen, which will take you to the Welcome page. Then click on \"Sign up Now\" on the right side of the page.     You will be asked to enter the access code listed below, as well as some personal information. Please follow the directions to create your username and password.     Your access code is: [email or web address]  Expires: 2018 10:16 AM     Your access code will  in 90 days. If you need help or a new code, please call your [facility] clinic or [phone].        Care EveryWhere ID     This is your Care EveryWhere ID. This could be used by other organizations to access your [facility] medical records  [id number]        Your Vitals Were     Pulse Temperature Respirations Pulse Oximetry BMI (Body Mass Index)       93 98.6  F (37  C) (Tympanic) 16 98% 20.95 kg/m2        Blood Pressure from Last 3 Encounters:   18 116/70   18 98/40   18 136/68    Weight from Last 3 Encounters:   18 66.2 kg (146 lb)   18 63.5 kg (140 lb)   18 65.8 kg (145 lb)              We Performed the Following     COLORECTAL SURGERY REFERRAL          Today's Medication Changes          These changes are accurate as of 3/12/18 10:33 AM.  If you have any questions, ask your nurse or doctor.               Start taking these medicines.        Dose/Directions    hydrocortisone 25 MG Suppository   Commonly known as:  ANUSOL-HC   Used for:  Internal hemorrhoids, Rectal pain   Started by:  Tanya Benitez MD        Dose:  25 mg   Place 1 suppository (25 mg) rectally 2 times daily   Quantity:  28 suppository   Refills:  1            Where to get your medicines    "   These medications were sent to [facility] - [place], [place] - [address] AT [address] [place] [address] & [address] [place] [address], [place] [address]    Hours:  Test fax successful 9/6/02  KR Phone:  [phone]     hydrocortisone 25 MG Suppository                Primary Care Provider Fax #    Physician No Ref-Primary [phone]       No address on file        Equal Access to Services     BRIAN BALLARD : Hadii aad ku hadasho Soomaali, waaxda luqadaha, qaybta kaalmada adeegyada, waxay idiin hayaan adeeg khmichellesh laraquel ah. So [facility] [phone].    ATENCIÓN: Si evangelist dooley, tiene a allen disposición servicios gratuitos de asistencia lingüística. Llame al [phone].    We comply with applicable federal civil rights laws and [place] laws. We do not discriminate on the basis of race, color, national origin, age, disability, sex, sexual orientation, or gender identity.            Thank you!     Thank you for choosing [facility]  for your care. Our goal is always to provide you with excellent care. Hearing back from our patients is one way we can continue to improve our services. Please take a few minutes to complete the written survey that you may receive in the mail after your visit with us. Thank you!             Your Updated Medication List - Protect others around you: Learn how to safely use, store and throw away your medicines at www.disposemymeds.org.          This list is accurate as of 3/12/18 10:33 AM.  Always use your most recent med list.                   Brand Name Dispense Instructions for use Diagnosis    CLONAZEPAM PO      Take 0.5 mg by mouth 3 times daily as needed for anxiety        CONCERTA 36 MG CR tablet   Generic drug:  methylphenidate ER           ELAVIL PO      Take 10 % by mouth nightly as needed for sleep (1 - 2 tablets as needed at bedtime)        gabapentin 300 MG capsule    NEURONTIN     Take 300 mg by mouth 3 times daily        guanFACINE 2 MG  tablet    TENEX     Take 2 mg by mouth At Bedtime        hydrocortisone 25 MG Suppository    ANUSOL-HC    28 suppository    Place 1 suppository (25 mg) rectally 2 times daily    Internal hemorrhoids, Rectal pain       LEXAPRO 10 MG tablet   Generic drug:  escitalopram      Take 50 mg by mouth daily        omeprazole 20 MG CR capsule    priLOSEC    30 capsule    Take 1 capsule (20 mg) by mouth daily    Gastroesophageal reflux disease, esophagitis presence not specified       TEMAZEPAM PO

## 2018-03-12 NOTE — TELEPHONE ENCOUNTER
I have called [facility] Surgery dept at [phone] and left message on voicemail to call me to set up appt for Ronnie for second opinion re: hemorrhoids and rectal pain.  I will wait for them to call.  I spoke with Ronnie at his appt today to let him know this.

## 2018-03-12 NOTE — NURSING NOTE
"Chief Complaint   Patient presents with     RECHECK     S/P colonoscopy from 2/19/2018       Initial There were no vitals taken for this visit. Estimated body mass index is 20.09 kg/(m^2) as calculated from the following:    Height as of 2/19/18: 1.778 m (5' 10\").    Weight as of 2/19/18: 63.5 kg (140 lb).  Medication Reconciliation: complete    "

## 2018-03-12 NOTE — TELEPHONE ENCOUNTER
[facility] does not have a colorectal surgeon.  I called Colon and rectal Surgeon [facility] at [phone] option 5.  Ronnie has an appt April 2, 2018 at 11 am.  He is asked to show up 20 min. Early.  They will mail him paperwork.  Ronnie was notified of this appt.

## 2018-03-12 NOTE — LETTER
"    3/12/2018         RE: Ronnie Jackson  [address]   [facility] [address]        Dear Colleague,    Thank you for referring your patient, Ronnie Jackson, to the [facility]. Please see a copy of my visit note below.    [facility] FOLLOW-UP NOTE  GENERAL SURGERY    PCP: No Ref-Primary, Physician         Assessment and Plan:      Ronnie Jackson is a 27 year old male who presented post operatively from 2/19/2018 for colonoscopy and exam under anesthesia and is doing adequately.       ICD-10-CM    1. Rectal pain K62.89    2. Internal hemorrhoids K64.8    3. Anxiety F41.9        I reviewed the pathology report today with the patient and answered all questions.    Pt still complaining of rectal pain that radiates into his back, blood per rectum when he wipes, and describing \"tissue\" that comes out of rectum when he has a BM and that he has to push that back in.  He's also very concern his symptoms may be relating to his kidneys, prostate, bladder, and cancer.  He's tells me that he has been taking 20g of fiber daily and drinking plenty of fluid but then stated that the symptoms above worsen when he's constipated and straining.  I reassured the patient that he does not have rectal prolapse, these symptoms are unlikely related to his kidneys, prostate, or bladder, and that I did not see any \"cancer\" or polyps during his colonoscopy.  I reassured that that he does have stage 1 maybe stage 2 internal hemorrhoids but no external hemorrhoids and no anal fissures; the bleeding from his rectum likely due to the hyperemeic nature of his sigmoid colon, rectum and anus and not from the internal hemorrhoids.  I'm unsure of the etiology at this point.  The sigmoid biopsy of the inflamed tissue showed \"normal mucosa\" on final pathology.  I am not sure of this \"tissue\" that the pt has to \"push\" back in after his BM.  I offer to look again during this visit as pt insisted that he has to \"push\" tissue back in after his " "BM.  However, he decline another look in office exam and would like a second opinion.      Thus, I recommend that pt use anusol suppository to help with the inflammation, will place referral to colorectal surgeon for second opinion, pt to stop using his \"herbal\" supplement, and to continue his fiber/increased fluid regimen.      40 mins visit, more than 50% of face to face time was spent in counseling and coordinating care as discussed above.           Subjective:     Ronnie Jackson is a 27 year old male who presents post operatively from 2/19/2018 for colonoscopy and exam under anesthesia and is doing adequately. Pain has been controled. Currently is not using pain medications. Eating a Regular and having regular bladder/bowel function. Overall doing adequately.           Objective:     Skin: negative, scaling, itching, bruising  Ears/Nose/Throat: negative  Respiratory: No shortness of breath, dyspnea on exertion, cough, or hemoptysis  Cardiovascular: palpitations, tachycardia and irregular heart beat  Gastrointestinal: positive for abdominal pain, hemorrhoids and hematochezia  Genitourinary: positive for frequency  Musculoskeletal: positive for back pain  Neurologic: negative, syncope, stroke and seizures  Hematologic/Lymphatic/Immunologic: negative  Endocrine: negative    /70 (BP Location: Right arm, Patient Position: Chair, Cuff Size: Adult Large)  Pulse 93  Temp 98.6  F (37  C) (Tympanic)  Resp 16  Wt 66.2 kg (146 lb)  SpO2 98%  BMI 20.95 kg/m2   Constitutional: Awake, alert, in no acute distress.  Respiratory: Non-labored.   Cardiovascular: Regular rate and rhythm.   Abdomen: Soft, ND, NT.    Tanya Benitez DO  [facility]              Again, thank you for allowing me to participate in the care of your patient.        Sincerely,        MicahPeter Benitez MD    "

## 2018-03-19 DIAGNOSIS — K21.9 GASTROESOPHAGEAL REFLUX DISEASE, ESOPHAGITIS PRESENCE NOT SPECIFIED: ICD-10-CM

## 2018-03-19 NOTE — LETTER
[address]  [facility] [address]  [phone]        March 26, 2018    Ronnie Jackson  [address] [facility] [address]          Dear Ronnie,    We have received a refill request for your Omeprazole. Please contact the clinic to let us know how this medication is working for you and if you would like to continue it or try a different medication    Sincerely,        Your [facility] Care Team

## 2018-03-20 NOTE — TELEPHONE ENCOUNTER
"omeprazole (PRILOSEC) 20 MG CR capsule  Per LOV with JM 01/17/2018, \"His epigastric pain is consistent with GERD so will start him on omeprazole daily with breakfast to take for 6-8 weeks. If not helping, he will let me know. If working well, may switch to an H2 blocker instead due to potential long-term side effects of PPIs. He was encouraged to cut back on the foods that exacerbate his symptoms like spicy foods, tomato based foods, and energy drinks.\"    LPN/MA: Please call patient and see if helping    Estela Newby, RN, BSN     "

## 2018-03-21 NOTE — TELEPHONE ENCOUNTER
Attempted patient, unable to LM. Please get information on if the Prilosec has been working for patient - see message below from LOV.     Rubi Shultz, RN, BSN